# Patient Record
Sex: FEMALE | Race: BLACK OR AFRICAN AMERICAN | Employment: PART TIME | URBAN - METROPOLITAN AREA
[De-identification: names, ages, dates, MRNs, and addresses within clinical notes are randomized per-mention and may not be internally consistent; named-entity substitution may affect disease eponyms.]

---

## 2017-06-13 ENCOUNTER — HOSPITAL ENCOUNTER (OUTPATIENT)
Dept: ULTRASOUND IMAGING | Age: 42
Discharge: HOME | End: 2017-06-13
Attending: OBSTETRICS & GYNECOLOGY
Payer: COMMERCIAL

## 2017-06-13 ENCOUNTER — OFFICE VISIT (OUTPATIENT)
Dept: OBGYN CLINIC | Facility: CLINIC | Age: 42
End: 2017-06-13

## 2017-06-13 ENCOUNTER — TELEPHONE (OUTPATIENT)
Dept: OBGYN CLINIC | Facility: CLINIC | Age: 42
End: 2017-06-13

## 2017-06-13 ENCOUNTER — HOSPITAL ENCOUNTER (OUTPATIENT)
Dept: MAMMOGRAPHY | Age: 42
Discharge: HOME | End: 2017-06-13
Attending: FAMILY MEDICINE
Payer: COMMERCIAL

## 2017-06-13 ENCOUNTER — LAB ENCOUNTER (OUTPATIENT)
Dept: LAB | Age: 42
End: 2017-06-13
Attending: FAMILY MEDICINE
Payer: COMMERCIAL

## 2017-06-13 VITALS
HEIGHT: 63 IN | DIASTOLIC BLOOD PRESSURE: 90 MMHG | BODY MASS INDEX: 30.3 KG/M2 | SYSTOLIC BLOOD PRESSURE: 142 MMHG | WEIGHT: 171 LBS

## 2017-06-13 DIAGNOSIS — Z01.419 ENCOUNTER FOR GYNECOLOGICAL EXAMINATION WITHOUT ABNORMAL FINDING: Primary | ICD-10-CM

## 2017-06-13 DIAGNOSIS — N92.0 MENORRHAGIA WITH REGULAR CYCLE: ICD-10-CM

## 2017-06-13 DIAGNOSIS — Z12.31 ENCOUNTER FOR SCREENING MAMMOGRAM FOR MALIGNANT NEOPLASM OF BREAST: ICD-10-CM

## 2017-06-13 DIAGNOSIS — I10 ESSENTIAL HYPERTENSION, MALIGNANT: Primary | ICD-10-CM

## 2017-06-13 PROCEDURE — 99396 PREV VISIT EST AGE 40-64: CPT | Performed by: OBSTETRICS & GYNECOLOGY

## 2017-06-13 PROCEDURE — 87624 HPV HI-RISK TYP POOLED RSLT: CPT | Performed by: OBSTETRICS & GYNECOLOGY

## 2017-06-13 PROCEDURE — 85025 COMPLETE CBC W/AUTO DIFF WBC: CPT | Performed by: FAMILY MEDICINE

## 2017-06-13 PROCEDURE — 80061 LIPID PANEL: CPT | Performed by: FAMILY MEDICINE

## 2017-06-13 PROCEDURE — 36415 COLL VENOUS BLD VENIPUNCTURE: CPT | Performed by: FAMILY MEDICINE

## 2017-06-13 PROCEDURE — 88175 CYTOPATH C/V AUTO FLUID REDO: CPT | Performed by: OBSTETRICS & GYNECOLOGY

## 2017-06-13 PROCEDURE — 80053 COMPREHEN METABOLIC PANEL: CPT | Performed by: FAMILY MEDICINE

## 2017-06-13 PROCEDURE — 77067 SCR MAMMO BI INCL CAD: CPT | Performed by: FAMILY MEDICINE

## 2017-06-13 PROCEDURE — 76856 US EXAM PELVIC COMPLETE: CPT | Performed by: OBSTETRICS & GYNECOLOGY

## 2017-06-13 PROCEDURE — 76830 TRANSVAGINAL US NON-OB: CPT | Performed by: OBSTETRICS & GYNECOLOGY

## 2017-06-13 RX ORDER — CLONAZEPAM 1 MG/1
1 TABLET ORAL 2 TIMES DAILY PRN
COMMUNITY

## 2017-06-13 NOTE — TELEPHONE ENCOUNTER
Called and soheila pt usn results. Plan office sonohysterogram to evaluate submucous fibroid. If not present at that time she may have Mirena IUD placed that day.
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
## 2017-06-19 ENCOUNTER — HOSPITAL ENCOUNTER (OUTPATIENT)
Dept: MAMMOGRAPHY | Facility: HOSPITAL | Age: 42
Discharge: HOME | End: 2017-06-19
Attending: FAMILY MEDICINE
Payer: COMMERCIAL

## 2017-06-19 ENCOUNTER — HOSPITAL ENCOUNTER (OUTPATIENT)
Dept: ULTRASOUND IMAGING | Facility: HOSPITAL | Age: 42
Discharge: HOME | End: 2017-06-19
Attending: FAMILY MEDICINE
Payer: COMMERCIAL

## 2017-06-19 DIAGNOSIS — R92.8 ABNORMAL MAMMOGRAM: ICD-10-CM

## 2017-06-19 PROCEDURE — 76642 ULTRASOUND BREAST LIMITED: CPT | Performed by: FAMILY MEDICINE

## 2017-06-19 PROCEDURE — 77065 DX MAMMO INCL CAD UNI: CPT | Performed by: FAMILY MEDICINE

## 2017-06-19 NOTE — PROGRESS NOTES
Quick Note:    Pt with f/u appt on 6/29. Dr. Chandrika Small to review PAP smear results at that time.   ______

## 2017-06-19 NOTE — IMAGING NOTE
Juaquin Pagan    ALLERGIES neosporin, dilantin , morphine    ALLERGY LATEX  YES OR NO - rash , hives    HT    WT 5'3, 170 lb  CELL #[phone]    PHONE #      RECOMMENDATION:  LEFT BREAST

## 2017-06-20 ENCOUNTER — NURSE NAVIGATOR ENCOUNTER (OUTPATIENT)
Dept: HEMATOLOGY/ONCOLOGY | Facility: HOSPITAL | Age: 42
End: 2017-06-20

## 2017-06-20 NOTE — PROGRESS NOTES
Discussed recommended breast biopsy with patient. Reviewed pertinent patient history, family history of cancer, and patient medications.     - Discussed with patient Radiology’s protocol for being off blood thinning medications (including ibuprofen/advil should be reported to the ordering physician. Reviewed results process with patient and discussed that Navigator will contact patient after biopsy for follow-up and to answer any questions/concerns.   Pt history discussed as below:  Pt history of biopsy:

## 2017-06-27 ENCOUNTER — HOSPITAL ENCOUNTER (OUTPATIENT)
Dept: ULTRASOUND IMAGING | Facility: HOSPITAL | Age: 42
Discharge: HOME | End: 2017-06-27
Attending: FAMILY MEDICINE
Payer: COMMERCIAL

## 2017-06-27 ENCOUNTER — HOSPITAL ENCOUNTER (OUTPATIENT)
Dept: MAMMOGRAPHY | Facility: HOSPITAL | Age: 42
Discharge: HOME | End: 2017-06-27
Attending: FAMILY MEDICINE
Payer: COMMERCIAL

## 2017-06-27 VITALS
OXYGEN SATURATION: 100 % | HEIGHT: 63 IN | HEART RATE: 71 BPM | DIASTOLIC BLOOD PRESSURE: 65 MMHG | WEIGHT: 170 LBS | SYSTOLIC BLOOD PRESSURE: 124 MMHG | BODY MASS INDEX: 30.12 KG/M2 | RESPIRATION RATE: 16 BRPM

## 2017-06-27 DIAGNOSIS — N63.0 BREAST MASS: ICD-10-CM

## 2017-06-27 PROCEDURE — 77065 DX MAMMO INCL CAD UNI: CPT | Performed by: FAMILY MEDICINE

## 2017-06-27 PROCEDURE — 88172 CYTP DX EVAL FNA 1ST EA SITE: CPT | Performed by: FAMILY MEDICINE

## 2017-06-27 PROCEDURE — 88173 CYTOPATH EVAL FNA REPORT: CPT | Performed by: FAMILY MEDICINE

## 2017-06-27 PROCEDURE — 10022 US FINE NEEDLE ASPIRATION  W GUIDE  (CPT=10022): CPT | Performed by: FAMILY MEDICINE

## 2017-06-27 PROCEDURE — 19285 PERQ DEV BREAST 1ST US IMAG: CPT

## 2017-06-27 PROCEDURE — 88177 CYTP FNA EVAL EA ADDL: CPT | Performed by: FAMILY MEDICINE

## 2017-06-27 PROCEDURE — 88305 TISSUE EXAM BY PATHOLOGIST: CPT | Performed by: FAMILY MEDICINE

## 2017-06-27 PROCEDURE — 76942 ECHO GUIDE FOR BIOPSY: CPT | Performed by: FAMILY MEDICINE

## 2017-06-27 RX ORDER — CLONAZEPAM 1 MG/1
2 TABLET ORAL
COMMUNITY
Start: 2017-06-20 | End: 2017-06-29

## 2017-06-27 NOTE — IMAGING NOTE
PT ARRIVED TO ROOM 3   SCANS BY Karon LE HX TAKEN PROCEDURE - pt denies asa, nsaid use - all latex free supplies used for procedure     EXPLAINED QUESTIONS ANSWERED - YES    PT CONSENTED AT 0805    SCOUTS SCANS COMPLETED BY  DR Galina Skelton  TIMEOUT REBECCA

## 2017-06-29 ENCOUNTER — OFFICE VISIT (OUTPATIENT)
Dept: OBGYN CLINIC | Facility: CLINIC | Age: 42
End: 2017-06-29

## 2017-06-29 ENCOUNTER — ORDERS FOR ADMISSION (OUTPATIENT)
Dept: OBGYN CLINIC | Facility: CLINIC | Age: 42
End: 2017-06-29

## 2017-06-29 ENCOUNTER — NURSE NAVIGATOR ENCOUNTER (OUTPATIENT)
Dept: HEMATOLOGY/ONCOLOGY | Facility: HOSPITAL | Age: 42
End: 2017-06-29

## 2017-06-29 VITALS — DIASTOLIC BLOOD PRESSURE: 80 MMHG | SYSTOLIC BLOOD PRESSURE: 120 MMHG

## 2017-06-29 DIAGNOSIS — N92.0 MENORRHAGIA WITH REGULAR CYCLE: Primary | ICD-10-CM

## 2017-06-29 PROCEDURE — 58340 CATHETER FOR HYSTEROGRAPHY: CPT | Performed by: OBSTETRICS & GYNECOLOGY

## 2017-06-29 PROCEDURE — 76831 ECHO EXAM UTERUS: CPT | Performed by: OBSTETRICS & GYNECOLOGY

## 2017-06-29 PROCEDURE — 81025 URINE PREGNANCY TEST: CPT | Performed by: OBSTETRICS & GYNECOLOGY

## 2017-06-29 RX ORDER — ALBUTEROL SULFATE 90 UG/1
2 AEROSOL, METERED RESPIRATORY (INHALATION)
COMMUNITY
Start: 2015-09-28

## 2017-06-29 NOTE — PROGRESS NOTES
Here for sono hysterstogram. USN showed 1 cm submucous fibroid. Sonohys performed with posterior uterine polyp. I dw pt options 1.) do nothing b/c her bleeding is better, 2.) remove polyp via hysteroscope, 3.) remove polyp/TL and endometrial ablation, 4.

## 2017-06-29 NOTE — H&P
HPI: Patient is a 43year old  Patient's last menstrual period was 2017 (exact date). who presents for above. Last pap . Periods have become increasingly heavy until recently. Using super tampon, changing every 4 hours.  No h/o anemia, no sk Education: N/A   Number of Children: N/A      Occupational History  mri tech   travels      Social History Main Topics    Smoking status: Never Smoker               Smokeless tobacco: Never Used            Alcohol Use: No            Drug Use: No     Sexual MD Carrie

## 2017-06-29 NOTE — PROGRESS NOTES
Patient reports some soreness after biopsy, no bruising noted. Discussed with patient to use a supportive bra and ice packs and to contact Dr. Abena Patel for any soreness that does not get better. Pt acknowledged.   Pt was referred to [facility] by Dr. Therese Pina

## 2017-06-29 NOTE — PROGRESS NOTES
Progress Notes        Here for sono hysterstogram. USN showed 1 cm submucous fibroid. Sonohys performed with posterior uterine polyp.   I dw pt options 1.) do nothing b/c her bleeding is better, 2.) remove polyp via hysteroscope, 3.) remove polyp/TL and en

## 2017-06-30 NOTE — PROGRESS NOTES
Official [facility] report: 2D sonohysterogram shows a 8 mm x 6 mm polyp like echo along the posterior wall of the endometrium. Otherwise, the walls of the endometrium are within normal range, double layer thickness = 7mm.

## 2017-07-07 ENCOUNTER — TELEPHONE (OUTPATIENT)
Dept: OBGYN CLINIC | Facility: CLINIC | Age: 42
End: 2017-07-07

## 2017-07-07 NOTE — TELEPHONE ENCOUNTER
Called patient on 7-6-17 to let her know her insurance has been terminated and she told me that she would look into it and call me back.     On 7-7-17 I called patient again and she said that we need to postpone surgery for now until insurance issues gets r
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
## (undated) NOTE — MR AVS SNAPSHOT
[address] at [address], [place] [address]  [phone]               Thank you for choosing us for your health care visit with Florecita Hogue MD.  We are glad to serve you and happy to frantz [facility]  Diagnostics Main [facility] Parking) (Yellow Parking)  [address]   [address] [facility],  [address], [address] [facility]  [address]  Main information, go to [email or web address] and click on the Sign Up Now link in the [employer] box. Enter your [employer] Activation Code exactly as it appears below along with your Zip Code and Date of Birth to complete the sign-up process.  If you do Make half your plate fruits and vegetables Highly refined, white starches including white bread, rice and pasta   Eat plenty of protein, keep the fat content low Sugars:  sodas and sports drinks, candies and desserts   Eat plenty of low-fat dairy products

## (undated) NOTE — LETTER
Issac Nair, [facility]:[birth year]    CONSENT FOR PROCEDURE/SEDATION    1. I authorize the performance upon Issac Nair  the following: Sonohysterogram    2.  I authorize Dr. Melvin Parnell MD (and whomever is designated as the [employer] Witness: _________________________________________ Date:___________     Physician Signature: _______________________________ Date:___________

## (undated) NOTE — LETTER
[address] [place] [address] Rd, [place], [place]     AUTHORIZATION FOR SURGICAL OPERATION OR PROCEDURE    1.    I hereby authorize Dr. Zack Merida, my Physician(s) and whomever may be designated as the doctor's Assistant, to perform the following o to have an autologous transfusion of my own blood, or a directed donor transfusion, I will discuss this with my         Physician.   5.   I consent to the photographing of procedure(s) to be performed for the purposes of advancing medicine, science (Responsible person in case of minor or unconscious patient)   (Relationship to Patient)      _______________________________________________________________ ____________________________  (Witness signature)